# Patient Record
Sex: FEMALE | Race: WHITE | NOT HISPANIC OR LATINO | URBAN - METROPOLITAN AREA
[De-identification: names, ages, dates, MRNs, and addresses within clinical notes are randomized per-mention and may not be internally consistent; named-entity substitution may affect disease eponyms.]

---

## 2017-02-17 ENCOUNTER — AMBULATORY - HEALTHEAST (OUTPATIENT)
Dept: NURSING | Facility: CLINIC | Age: 22
End: 2017-02-17

## 2017-05-19 ENCOUNTER — AMBULATORY - HEALTHEAST (OUTPATIENT)
Dept: NURSING | Facility: CLINIC | Age: 22
End: 2017-05-19

## 2017-06-12 ENCOUNTER — OFFICE VISIT - HEALTHEAST (OUTPATIENT)
Dept: FAMILY MEDICINE | Facility: CLINIC | Age: 22
End: 2017-06-12

## 2017-06-12 DIAGNOSIS — N39.0 UTI (URINARY TRACT INFECTION): ICD-10-CM

## 2017-06-12 DIAGNOSIS — R30.0 DYSURIA: ICD-10-CM

## 2017-06-12 ASSESSMENT — MIFFLIN-ST. JEOR: SCORE: 1513.59

## 2017-08-18 ENCOUNTER — AMBULATORY - HEALTHEAST (OUTPATIENT)
Dept: NURSING | Facility: CLINIC | Age: 22
End: 2017-08-18

## 2017-11-10 ENCOUNTER — AMBULATORY - HEALTHEAST (OUTPATIENT)
Dept: FAMILY MEDICINE | Facility: CLINIC | Age: 22
End: 2017-11-10

## 2017-11-13 ENCOUNTER — COMMUNICATION - HEALTHEAST (OUTPATIENT)
Dept: FAMILY MEDICINE | Facility: CLINIC | Age: 22
End: 2017-11-13

## 2017-11-14 ENCOUNTER — AMBULATORY - HEALTHEAST (OUTPATIENT)
Dept: FAMILY MEDICINE | Facility: CLINIC | Age: 22
End: 2017-11-14

## 2017-11-15 ENCOUNTER — AMBULATORY - HEALTHEAST (OUTPATIENT)
Dept: NURSING | Facility: CLINIC | Age: 22
End: 2017-11-15

## 2017-11-15 ENCOUNTER — AMBULATORY - HEALTHEAST (OUTPATIENT)
Dept: FAMILY MEDICINE | Facility: CLINIC | Age: 22
End: 2017-11-15

## 2017-11-15 DIAGNOSIS — Z30.42 ENCOUNTER FOR SURVEILLANCE OF INJECTABLE CONTRACEPTIVE: ICD-10-CM

## 2017-12-15 ENCOUNTER — OFFICE VISIT - HEALTHEAST (OUTPATIENT)
Dept: FAMILY MEDICINE | Facility: CLINIC | Age: 22
End: 2017-12-15

## 2017-12-15 DIAGNOSIS — Z23 NEED FOR IMMUNIZATION AGAINST INFLUENZA: ICD-10-CM

## 2017-12-15 DIAGNOSIS — Z30.42 ENCOUNTER FOR SURVEILLANCE OF INJECTABLE CONTRACEPTIVE: ICD-10-CM

## 2017-12-15 ASSESSMENT — MIFFLIN-ST. JEOR: SCORE: 1499.53

## 2018-03-09 ENCOUNTER — AMBULATORY - HEALTHEAST (OUTPATIENT)
Dept: NURSING | Facility: CLINIC | Age: 23
End: 2018-03-09

## 2018-07-20 ENCOUNTER — COMMUNICATION - HEALTHEAST (OUTPATIENT)
Dept: FAMILY MEDICINE | Facility: CLINIC | Age: 23
End: 2018-07-20

## 2018-07-20 ENCOUNTER — OFFICE VISIT - HEALTHEAST (OUTPATIENT)
Dept: FAMILY MEDICINE | Facility: CLINIC | Age: 23
End: 2018-07-20

## 2018-07-20 DIAGNOSIS — Z30.42 ENCOUNTER FOR SURVEILLANCE OF INJECTABLE CONTRACEPTIVE: ICD-10-CM

## 2018-07-20 DIAGNOSIS — Z12.4 SCREENING FOR MALIGNANT NEOPLASM OF CERVIX: ICD-10-CM

## 2018-07-20 DIAGNOSIS — Z11.3 SCREENING EXAMINATION FOR SEXUALLY TRANSMITTED DISEASE: ICD-10-CM

## 2018-07-20 LAB
HCG UR QL: NEGATIVE
SP GR UR STRIP: 1.01 (ref 1–1.03)

## 2018-07-20 ASSESSMENT — MIFFLIN-ST. JEOR: SCORE: 1509.96

## 2018-07-23 LAB
C TRACH DNA SPEC QL PROBE+SIG AMP: NEGATIVE
N GONORRHOEA DNA SPEC QL NAA+PROBE: NEGATIVE

## 2018-07-24 ENCOUNTER — COMMUNICATION - HEALTHEAST (OUTPATIENT)
Dept: FAMILY MEDICINE | Facility: CLINIC | Age: 23
End: 2018-07-24

## 2018-08-03 ENCOUNTER — AMBULATORY - HEALTHEAST (OUTPATIENT)
Dept: NURSING | Facility: CLINIC | Age: 23
End: 2018-08-03

## 2018-08-03 DIAGNOSIS — Z30.42 ENCOUNTER FOR SURVEILLANCE OF INJECTABLE CONTRACEPTIVE: ICD-10-CM

## 2018-08-03 LAB
HCG UR QL: NEGATIVE
SP GR UR STRIP: 1.02 (ref 1–1.03)

## 2018-10-26 ENCOUNTER — AMBULATORY - HEALTHEAST (OUTPATIENT)
Dept: NURSING | Facility: CLINIC | Age: 23
End: 2018-10-26

## 2019-01-25 ENCOUNTER — COMMUNICATION - HEALTHEAST (OUTPATIENT)
Dept: FAMILY MEDICINE | Facility: CLINIC | Age: 24
End: 2019-01-25

## 2019-01-25 ENCOUNTER — AMBULATORY - HEALTHEAST (OUTPATIENT)
Dept: NURSING | Facility: CLINIC | Age: 24
End: 2019-01-25

## 2019-01-25 DIAGNOSIS — L01.00 IMPETIGO: ICD-10-CM

## 2019-01-25 DIAGNOSIS — Z30.42 ENCOUNTER FOR SURVEILLANCE OF INJECTABLE CONTRACEPTIVE: ICD-10-CM

## 2019-04-16 ENCOUNTER — AMBULATORY - HEALTHEAST (OUTPATIENT)
Dept: FAMILY MEDICINE | Facility: CLINIC | Age: 24
End: 2019-04-16

## 2019-04-16 DIAGNOSIS — Z30.8 ENCOUNTER FOR OTHER CONTRACEPTIVE MANAGEMENT: ICD-10-CM

## 2019-04-17 ENCOUNTER — AMBULATORY - HEALTHEAST (OUTPATIENT)
Dept: NURSING | Facility: CLINIC | Age: 24
End: 2019-04-17

## 2021-05-25 ENCOUNTER — RECORDS - HEALTHEAST (OUTPATIENT)
Dept: ADMINISTRATIVE | Facility: CLINIC | Age: 26
End: 2021-05-25

## 2021-05-31 ENCOUNTER — RECORDS - HEALTHEAST (OUTPATIENT)
Dept: ADMINISTRATIVE | Facility: CLINIC | Age: 26
End: 2021-05-31

## 2021-05-31 VITALS — BODY MASS INDEX: 21.37 KG/M2 | HEIGHT: 70 IN | WEIGHT: 149.3 LBS

## 2021-05-31 VITALS — WEIGHT: 152.4 LBS | HEIGHT: 70 IN | BODY MASS INDEX: 21.82 KG/M2

## 2021-06-01 VITALS — BODY MASS INDEX: 21.7 KG/M2 | WEIGHT: 151.6 LBS | HEIGHT: 70 IN

## 2021-06-11 NOTE — PROGRESS NOTES
Assessment & Plan:  1. Dysuria  UA suspicious for UTI.  - Urinalysis-UC if Indicated  - Culture, Urine    2. UTI (urinary tract infection)  Treat as below with antibiotics.  Will culture the urine and wait for results.  Let the patient know if any change in antibiotics is necessary.  Continue to push fluids.  Recommended cranberry supplements today given frequency of UTIs.  If she would have 1 or 2 more in the next couple of months we would want to refer her on to urology for evaluation.  - nitrofurantoin, macrocrystal-monohydrate, (MACROBID) 100 MG capsule; Take 1 capsule (100 mg total) by mouth 2 (two) times a day for 7 days.  Dispense: 14 capsule; Refill: 0      There are no Patient Instructions on file for this visit.    Orders Placed This Encounter   Procedures     Culture, Urine     Urinalysis-UC if Indicated     There are no discontinued medications.            Chief Complaint:   Chief Complaint   Patient presents with     Urinary Tract Infection     c/o urgency, pain with urination since yesterday--pt reports having UTI 1 month ago       History of Present Illness:  Mell is a 22 y.o. female presenting to the clinic today with UTI symptoms for about 2 days.  Symptoms include pain, urgency and frequency of urination.  No blood in the urine that she has noticed.  No new or different vaginal discharge.  She is sexually active with one partner.  She did have a UTI 1 month ago, prior to that she had one about 9 months ago.  She has had a fair amount of urinary tract infections in the past couple of years.    Review of Systems:  All other systems are negative except as noted above.    PFSH:  Reviewed and updated.    Tobacco Use:  History   Smoking Status     Never Smoker   Smokeless Tobacco     Not on file       Vitals:  Vitals:    06/12/17 1634   BP: 138/58   Patient Site: Right Arm   Patient Position: Sitting   Cuff Size: Adult Regular   Pulse: 72   Resp: 16   Temp: 98.5  F (36.9  C)   TempSrc: Oral   Weight:  "152 lb 6.4 oz (69.1 kg)   Height: 5' 9.5\" (1.765 m)     Wt Readings from Last 3 Encounters:   06/12/17 152 lb 6.4 oz (69.1 kg)   11/18/16 154 lb 8 oz (70.1 kg) (84 %, Z= 0.98)*   10/17/16 156 lb 1.6 oz (70.8 kg) (85 %, Z= 1.03)*     * Growth percentiles are based on [facility] 2-20 Years data.       Physical Exam:  Constitutional:  Reveals an alert, cooperative, 22-year-old female in no acute distress.  Vitals:  Per nursing notes.  GI:  non-tender, non-distended.  Neither liver nor spleen palpable.  No CVA tenderness  Psychiatric:  Mood appropriate, memory intact.     Data Reviewed:  Additional History from Old Records or Another Person Summarized (2 total): None.     Decision to Obtain Extra information (1 total): None.     Radiology Tests Summarized and Ordered (XRAY/CT/MRI/DXA) (1 total): None.    Labs Reviewed and Ordered (1 total): UA, UC    Medicine Tests Summarized and Ordered (EKG/ECHO/COLONOSCOPY/EGD) (1 total): None.    Independent Review of EKG or X-Ray (2 each): None.    The visit lasted a total of 20 minutes face to face with the patient. Over 50% of the time was spent counseling and educating the patient about plan of care.    Medications:  Current Outpatient Prescriptions   Medication Sig Dispense Refill     nitrofurantoin, macrocrystal-monohydrate, (MACROBID) 100 MG capsule Take 1 capsule (100 mg total) by mouth 2 (two) times a day for 7 days. 14 capsule 0     Current Facility-Administered Medications   Medication Dose Route Frequency Provider Last Rate Last Dose     medroxyPROGESTERone injection 150 mg (DEPO-PROVERA)  150 mg Intramuscular Q3 Months Lolis Stiles PA-C   150 mg at 05/19/17 1526       Total Data Points: 1    CHRISTINE Foy, CNP    This note has been dictated using voice recognition software. Any grammatical or context distortions are unintentional and inherent to the software      "

## 2021-06-14 NOTE — PROGRESS NOTES
"  Assessment/Plan:       1. Encounter for surveillance of injectable contraceptive  Patient has been doing well on this contraceptive method and desires continuation.  Order given for 1 more year of injections at 3 month intervals.  - medroxyPROGESTERone injection 150 mg (DEPO-PROVERA); Inject 1 mL (150 mg total) into the shoulder, thigh, or buttocks every 3 (three) months.    2. Need for immunization against influenza  - Influenza, Seasonal,Quad Inj, 36+ MOS        Subjective:       Mell De Santiago is a 22 y.o. female who presents for a medication check related to her contraceptive method.  She has been using Depo-Provera injections for about the past year.  She used this in the past when she was in high school as well.  She enjoys this method.  She has no side effects that she is aware of.  She does not desire any changes in this regard.  She would also like to have a flu shot today.  She has no other questions or concerns.    The following portions of the patient's history were reviewed and updated as appropriate: allergies, current medications, past medical history, past social history and problem list.    No current outpatient prescriptions on file.    Review of Systems   Pertinent items are noted in HPI.      Objective:      /60 (Patient Site: Right Arm, Patient Position: Sitting, Cuff Size: Adult Regular)  Pulse 80  Resp 16  Ht 5' 9.5\" (1.765 m)  Wt 149 lb 4.8 oz (67.7 kg)  Breastfeeding? No  BMI 21.73 kg/m2    General appearance: alert, appears stated age and cooperative  Head: Normocephalic, without obvious abnormality, atraumatic  Eyes: Conjunctivae clear, sclerae anicteric  Lungs: clear to auscultation bilaterally  Heart: regular rate and rhythm, S1, S2 normal, no murmur, click, rub or gallop          "

## 2021-06-19 NOTE — PROGRESS NOTES
Assessment/Plan:       1. Encounter for surveillance of injectable contraceptive  Patient wishes to continue using Depo-Provera injections.  Discussed risks versus benefits of negative pregnancy test and injection today versus negative pregnancy test, waiting 2 weeks, having another negative test and receiving the injection at that time.  Patient prefers the latter approach.  She will return in 2 weeks for pregnancy test and that bowel injection.  - Pregnancy, Urine  - Pregnancy, Urine; Future    2. Screening for malignant neoplasm of cervix  Initial routine screening Pap smear performed today.  - Gynecologic Cytology (PAP Smear)    3. Screening examination for sexually transmitted disease  After discussion of risks and benefits, patient agrees to be screened for gonorrhea and chlamydia.  She has no specific concerns in this regard.  - Chlamydia trachomatis & Neisseria gonorrhoeae, Amplified Detection          Subjective:       Mell De Santiago is a 23 y.o. female who presents for a visit to discuss continued Depo-Provera injections.  She is 1 month late for her routine injection.  She states that she has had unprotected intercourse over the past 2 weeks.  She does not desire pregnancy, wishes to continue using Depo-Provera for birth control.  She does not have any menses on Depo-Provera has been on this method of contraception for approximately 1 year.  She has one male partner, has no concerns for sexually transmitted infections.  She has never had a Pap smear.    The following portions of the patient's history were reviewed and updated as appropriate: allergies, current medications, past medical history, past social history and problem list.    No current outpatient prescriptions on file.    Current Facility-Administered Medications:      medroxyPROGESTERone injection 150 mg (DEPO-PROVERA), 150 mg, Intramuscular, Q3 Months, Malini Ledesma MD, 150 mg at 03/09/18 2578    Review of Systems   Pertinent items  "are noted in HPI.      Objective:      /62 (Patient Site: Right Arm, Patient Position: Sitting, Cuff Size: Adult Regular)  Pulse (!) 56  Resp 16  Ht 5' 9.5\" (1.765 m)  Wt 151 lb 9.6 oz (68.8 kg)  Breastfeeding? No  BMI 22.07 kg/m2    General appearance: alert, appears stated age and cooperative  Pelvic: cervix normal in appearance, external genitalia normal, no adnexal masses or tenderness, no cervical motion tenderness, rectovaginal septum normal and Vagina normal with a mild amount of white discharge          "

## 2021-06-23 NOTE — TELEPHONE ENCOUNTER
Mell was here today at [facility] for her depo shot. There was no standing order. I have Dr. Bryant put in order for the injection today. Please put in standing order. Thanks!  xl

## 2021-06-23 NOTE — TELEPHONE ENCOUNTER
Left message for pt that she will be due for med check and physical in July.    Meenakshi Caldwell, CMA

## 2021-08-22 ENCOUNTER — HEALTH MAINTENANCE LETTER (OUTPATIENT)
Age: 26
End: 2021-08-22

## 2021-10-17 ENCOUNTER — HEALTH MAINTENANCE LETTER (OUTPATIENT)
Age: 26
End: 2021-10-17

## 2022-10-01 ENCOUNTER — HEALTH MAINTENANCE LETTER (OUTPATIENT)
Age: 27
End: 2022-10-01

## 2023-10-15 ENCOUNTER — HEALTH MAINTENANCE LETTER (OUTPATIENT)
Age: 28
End: 2023-10-15